# Patient Record
Sex: FEMALE | Employment: FULL TIME | URBAN - METROPOLITAN AREA
[De-identification: names, ages, dates, MRNs, and addresses within clinical notes are randomized per-mention and may not be internally consistent; named-entity substitution may affect disease eponyms.]

---

## 2021-04-09 ENCOUNTER — LAB ENCOUNTER (OUTPATIENT)
Dept: LAB | Age: 61
End: 2021-04-09
Attending: FAMILY MEDICINE
Payer: COMMERCIAL

## 2021-04-09 ENCOUNTER — OFFICE VISIT (OUTPATIENT)
Dept: FAMILY MEDICINE CLINIC | Facility: CLINIC | Age: 61
End: 2021-04-09
Payer: COMMERCIAL

## 2021-04-09 VITALS
SYSTOLIC BLOOD PRESSURE: 149 MMHG | RESPIRATION RATE: 18 BRPM | BODY MASS INDEX: 22.5 KG/M2 | WEIGHT: 131.81 LBS | DIASTOLIC BLOOD PRESSURE: 88 MMHG | HEART RATE: 83 BPM | HEIGHT: 64 IN | TEMPERATURE: 97 F

## 2021-04-09 DIAGNOSIS — Z12.31 ENCOUNTER FOR SCREENING MAMMOGRAM FOR BREAST CANCER: ICD-10-CM

## 2021-04-09 DIAGNOSIS — E78.5 HYPERLIPIDEMIA, UNSPECIFIED HYPERLIPIDEMIA TYPE: ICD-10-CM

## 2021-04-09 DIAGNOSIS — E11.39 TYPE 2 DIABETES MELLITUS WITH OTHER OPHTHALMIC COMPLICATION, WITHOUT LONG-TERM CURRENT USE OF INSULIN (HCC): Primary | ICD-10-CM

## 2021-04-09 DIAGNOSIS — E11.39 TYPE 2 DIABETES MELLITUS WITH OTHER OPHTHALMIC COMPLICATION, WITHOUT LONG-TERM CURRENT USE OF INSULIN (HCC): ICD-10-CM

## 2021-04-09 DIAGNOSIS — E13.69 OTHER SPECIFIED DIABETES MELLITUS WITH OTHER SPECIFIED COMPLICATION, UNSPECIFIED WHETHER LONG TERM INSULIN USE (HCC): ICD-10-CM

## 2021-04-09 DIAGNOSIS — I10 ESSENTIAL HYPERTENSION: ICD-10-CM

## 2021-04-09 DIAGNOSIS — Z12.11 COLON CANCER SCREENING: ICD-10-CM

## 2021-04-09 PROCEDURE — 83036 HEMOGLOBIN GLYCOSYLATED A1C: CPT | Performed by: FAMILY MEDICINE

## 2021-04-09 PROCEDURE — 82947 ASSAY GLUCOSE BLOOD QUANT: CPT | Performed by: FAMILY MEDICINE

## 2021-04-09 PROCEDURE — 36415 COLL VENOUS BLD VENIPUNCTURE: CPT

## 2021-04-09 PROCEDURE — 99203 OFFICE O/P NEW LOW 30 MIN: CPT | Performed by: FAMILY MEDICINE

## 2021-04-09 PROCEDURE — 85027 COMPLETE CBC AUTOMATED: CPT

## 2021-04-09 PROCEDURE — 82570 ASSAY OF URINE CREATININE: CPT

## 2021-04-09 PROCEDURE — 84443 ASSAY THYROID STIM HORMONE: CPT

## 2021-04-09 PROCEDURE — 3008F BODY MASS INDEX DOCD: CPT | Performed by: FAMILY MEDICINE

## 2021-04-09 PROCEDURE — 3079F DIAST BP 80-89 MM HG: CPT | Performed by: FAMILY MEDICINE

## 2021-04-09 PROCEDURE — 82043 UR ALBUMIN QUANTITATIVE: CPT

## 2021-04-09 PROCEDURE — 80061 LIPID PANEL: CPT

## 2021-04-09 PROCEDURE — 36416 COLLJ CAPILLARY BLOOD SPEC: CPT | Performed by: FAMILY MEDICINE

## 2021-04-09 PROCEDURE — 3077F SYST BP >= 140 MM HG: CPT | Performed by: FAMILY MEDICINE

## 2021-04-09 PROCEDURE — 80053 COMPREHEN METABOLIC PANEL: CPT

## 2021-04-09 RX ORDER — B-COMPLEX WITH VITAMIN C
2 TABLET ORAL DAILY
COMMUNITY
Start: 2020-12-07 | End: 2021-12-02

## 2021-04-09 RX ORDER — ROSUVASTATIN CALCIUM 10 MG/1
10 TABLET, COATED ORAL NIGHTLY
Qty: 90 TABLET | Refills: 3 | Status: SHIPPED | OUTPATIENT
Start: 2021-04-09 | End: 2021-04-15

## 2021-04-09 RX ORDER — LISINOPRIL 10 MG/1
10 TABLET ORAL DAILY
Qty: 90 TABLET | Refills: 3 | Status: SHIPPED | OUTPATIENT
Start: 2021-04-09 | End: 2021-04-09

## 2021-04-09 RX ORDER — LISINOPRIL 10 MG/1
10 TABLET ORAL DAILY
Qty: 90 TABLET | Refills: 3 | Status: SHIPPED | OUTPATIENT
Start: 2021-04-09 | End: 2021-04-15

## 2021-04-09 RX ORDER — ROSUVASTATIN CALCIUM 10 MG/1
10 TABLET, COATED ORAL NIGHTLY
Qty: 90 TABLET | Refills: 3 | Status: SHIPPED | OUTPATIENT
Start: 2021-04-09 | End: 2021-04-09

## 2021-04-09 NOTE — PROGRESS NOTES
HPI/Subjective:   Patient ID: Melissa Pérez is a 63year old female. New patient presents with a history of diabetes, diabetic eye complications, hypertension. Diabetes  She has type 2 diabetes mellitus. Her disease course has been worsening.  There a tenderness. Lymphadenopathy:      Cervical: No cervical adenopathy. Skin:     General: Skin is warm and dry. Neurological:      Mental Status: She is alert and oriented to person, place, and time.          Assessment & Plan:   Type 2 diabetes mellitus Platelet, No Differential [E]      Meds This Visit:  Requested Prescriptions     Signed Prescriptions Disp Refills   • metFORMIN HCl 500 MG Oral Tab 120 tablet 0     Sig: Take one tablet in the morning with breakfast for 5 days, then take one tablet twice

## 2021-04-09 NOTE — PATIENT INSTRUCTIONS
Schedule diabetic education    Check blood sugar before breakfast and 2 hours after largest meal.  Goal fasting less than 120, after meals less than 160    Check blood pressures about twice a week ALWAYS sitting for at least 5 minutes!      Start Metformin

## 2021-04-15 ENCOUNTER — TELEPHONE (OUTPATIENT)
Dept: FAMILY MEDICINE CLINIC | Facility: CLINIC | Age: 61
End: 2021-04-15

## 2021-04-15 RX ORDER — LISINOPRIL 10 MG/1
10 TABLET ORAL DAILY
Qty: 90 TABLET | Refills: 3 | Status: SHIPPED | OUTPATIENT
Start: 2021-04-15

## 2021-04-15 RX ORDER — ROSUVASTATIN CALCIUM 10 MG/1
10 TABLET, COATED ORAL NIGHTLY
Qty: 90 TABLET | Refills: 3 | Status: SHIPPED | OUTPATIENT
Start: 2021-04-15

## 2021-04-15 NOTE — TELEPHONE ENCOUNTER
Patient states the pharmacy did not receive medication refills. Please resend. She is requesting a call back when it is sent.    lisinopril 10 MG Oral Tab    Rosuvastatin Calcium 10 MG Oral Tab  metFORMIN HCl 500 MG Oral Tab

## 2021-04-15 NOTE — TELEPHONE ENCOUNTER
Spoke to patient and she says she spoke to Aurora Flores and the meds below are not sent. Advised her that she has 3 [facility] listed in her chart. Per patient she want's only the [facility] at [address]  I called the # to that [facility] and spok

## 2021-04-15 NOTE — TELEPHONE ENCOUNTER
Patient calling stating \"I missed a call. Someone called me about my results I think. \" Advised of the note attached to her results from 4/9/21:    Please let patient know labs all look good except high cholesterol.  She should go ahead with starting Metf

## 2021-04-30 ENCOUNTER — HOSPITAL ENCOUNTER (OUTPATIENT)
Dept: ENDOCRINOLOGY | Facility: HOSPITAL | Age: 61
End: 2021-04-30
Attending: FAMILY MEDICINE

## 2021-05-07 ENCOUNTER — APPOINTMENT (OUTPATIENT)
Dept: ENDOCRINOLOGY | Facility: HOSPITAL | Age: 61
End: 2021-05-07
Attending: FAMILY MEDICINE
Payer: COMMERCIAL

## 2021-07-30 ENCOUNTER — HOSPITAL ENCOUNTER (OUTPATIENT)
Dept: ENDOCRINOLOGY | Facility: HOSPITAL | Age: 61
Discharge: HOME | End: 2021-07-30
Attending: FAMILY MEDICINE
Payer: COMMERCIAL

## 2021-07-30 ENCOUNTER — TELEPHONE (OUTPATIENT)
Dept: FAMILY MEDICINE CLINIC | Facility: CLINIC | Age: 61
End: 2021-07-30

## 2021-07-30 VITALS — WEIGHT: 135 LBS | BODY MASS INDEX: 23 KG/M2

## 2021-07-30 DIAGNOSIS — E11.9 TYPE 2 DIABETES MELLITUS (HCC): Primary | ICD-10-CM

## 2021-07-30 NOTE — PROGRESS NOTES
More Vieira  : 1960 attended individual initial assessment for Diabetes Education:    Date: 2021   Start time: 830  End time:     HEMOGLOBIN A1C (%)   Date Value   2021 11.4 (A)        Assessment: 63year old female presents for DS covered. Patient verbalized understanding and has no further questions at this time.     Isaiah Nielson, RN,MSN

## 2021-07-30 NOTE — TELEPHONE ENCOUNTER
CSS=please call and assists patient to schedule for FU OV to discuss diabetes medication , please patient to have the blood sugar record and PCP will check it-see DR Butler's note below.     Anneliese Garcia MD  Em Rn Triage 55 minutes ago (9:42 AM)        P

## 2021-08-06 ENCOUNTER — APPOINTMENT (OUTPATIENT)
Dept: ENDOCRINOLOGY | Facility: HOSPITAL | Age: 61
End: 2021-08-06
Attending: FAMILY MEDICINE
Payer: COMMERCIAL

## 2021-08-12 NOTE — TELEPHONE ENCOUNTER
No MyChart. No response from the patient. NO PHONE RESPONSE letter mailed today. Thi encounter closed. No future appointments.

## 2022-04-22 ENCOUNTER — HOSPITAL ENCOUNTER (OUTPATIENT)
Dept: MRI IMAGING | Facility: HOSPITAL | Age: 62
Discharge: HOME | End: 2022-04-22
Attending: OPHTHALMOLOGY
Payer: COMMERCIAL

## 2022-04-22 DIAGNOSIS — H49.21 SIXTH NERVE PALSY, RIGHT: ICD-10-CM

## 2022-04-22 LAB — CREAT BLD-MCNC: 0.6 MG/DL

## 2022-04-22 PROCEDURE — A9575 INJ GADOTERATE MEGLUMI 0.1ML: HCPCS | Performed by: OPHTHALMOLOGY

## 2022-04-22 PROCEDURE — 70543 MRI ORBT/FAC/NCK W/O &W/DYE: CPT | Performed by: OPHTHALMOLOGY

## 2022-04-22 PROCEDURE — 82565 ASSAY OF CREATININE: CPT

## 2022-04-22 PROCEDURE — 70553 MRI BRAIN STEM W/O & W/DYE: CPT | Performed by: OPHTHALMOLOGY

## 2022-05-04 ENCOUNTER — TELEPHONE (OUTPATIENT)
Dept: FAMILY MEDICINE CLINIC | Facility: CLINIC | Age: 62
End: 2022-05-04

## 2022-08-16 ENCOUNTER — MED REC SCAN ONLY (OUTPATIENT)
Dept: FAMILY MEDICINE CLINIC | Facility: CLINIC | Age: 62
End: 2022-08-16

## 2023-04-10 ENCOUNTER — TELEPHONE (OUTPATIENT)
Dept: FAMILY MEDICINE CLINIC | Facility: CLINIC | Age: 63
End: 2023-04-10

## 2023-04-13 ENCOUNTER — PATIENT OUTREACH (OUTPATIENT)
Dept: CASE MANAGEMENT | Age: 63
End: 2023-04-13

## 2023-04-13 NOTE — PROCEDURES
The office order for PCP removal request is Approved and finalized on April 13, 2023.     Thanks,  [facility] [employer]

## 2025-02-18 ENCOUNTER — HOSPITAL ENCOUNTER (EMERGENCY)
Facility: HOSPITAL | Age: 65
Discharge: HOME | End: 2025-02-19
Attending: EMERGENCY MEDICINE
Payer: OTHER MISCELLANEOUS

## 2025-02-18 ENCOUNTER — APPOINTMENT (OUTPATIENT)
Dept: MRI IMAGING | Facility: HOSPITAL | Age: 65
End: 2025-02-18
Attending: EMERGENCY MEDICINE
Payer: OTHER MISCELLANEOUS

## 2025-02-18 DIAGNOSIS — M54.16 LUMBAR RADICULOPATHY: ICD-10-CM

## 2025-02-18 DIAGNOSIS — M54.12 CERVICAL RADICULOPATHY: Primary | ICD-10-CM

## 2025-02-18 PROCEDURE — 99284 EMERGENCY DEPT VISIT MOD MDM: CPT

## 2025-02-18 PROCEDURE — 99285 EMERGENCY DEPT VISIT HI MDM: CPT

## 2025-02-18 PROCEDURE — 72148 MRI LUMBAR SPINE W/O DYE: CPT | Performed by: EMERGENCY MEDICINE

## 2025-02-18 PROCEDURE — 72141 MRI NECK SPINE W/O DYE: CPT | Performed by: EMERGENCY MEDICINE

## 2025-02-18 PROCEDURE — 70551 MRI BRAIN STEM W/O DYE: CPT | Performed by: EMERGENCY MEDICINE

## 2025-02-18 RX ORDER — LIDOCAINE 50 MG/G
1 PATCH TOPICAL EVERY 24 HOURS
Qty: 10 PATCH | Refills: 0 | Status: SHIPPED | OUTPATIENT
Start: 2025-02-18

## 2025-02-18 RX ORDER — ACETAMINOPHEN 325 MG/1
650 TABLET ORAL EVERY 6 HOURS PRN
Qty: 20 TABLET | Refills: 0 | Status: SHIPPED | OUTPATIENT
Start: 2025-02-18

## 2025-02-19 VITALS
BODY MASS INDEX: 25.52 KG/M2 | SYSTOLIC BLOOD PRESSURE: 155 MMHG | DIASTOLIC BLOOD PRESSURE: 93 MMHG | TEMPERATURE: 98 F | OXYGEN SATURATION: 98 % | HEIGHT: 63 IN | HEART RATE: 72 BPM | WEIGHT: 144 LBS | RESPIRATION RATE: 18 BRPM

## 2025-02-19 NOTE — ED PROVIDER NOTES
Patient Seen in: [facility] Emergency Department    History     Chief Complaint   Patient presents with    Trauma    Back Pain    Abdomen/Flank Pain    Neck Pain       HPI    64-year-old female with hypertension, diabetes, hyperlipidemia who was sent here from her doctor's office today to get an MRI.  Patient was injured at work 2 to 3 weeks ago was seen at the ER that day at [facility].  CTs of her neck abdomen/pelvis/lumbar spine.  No acute injury was discharged home.  Since then she is states she has been having continued pain in her arms and legs feel weak.  No trouble moving arms or legs.  No trouble walking.  No slurred speech or facial droop.  Her doctor sent her here to get MRIs of her brain, C-spine, L-spine.  She has no trouble urinating or having bowel movements.  No numbness in her groin area.    History reviewed.   Past Medical History:    Diabetes (HCC)    Essential hypertension    Hyperlipidemia       History reviewed. History reviewed. No pertinent surgical history.      Medications :  Prescriptions Prior to Admission[1]     History reviewed. No pertinent family history.    Smoking Status:   Social History     Socioeconomic History    Marital status:    Tobacco Use    Smoking status: Never    Smokeless tobacco: Never   Substance and Sexual Activity    Alcohol use: Never    Drug use: Never       Constitutional and vital signs reviewed.      Social History and Family History elements reviewed from today, pertinent positives to the presenting problem noted.    Physical Exam     ED Triage Vitals   BP 02/18/25 1908 (!) 172/88   Pulse 02/18/25 1908 76   Resp 02/18/25 1908 18   Temp 02/18/25 1908 98 °F (36.7 °C)   Temp src 02/19/25 0122 Oral   SpO2 02/18/25 1908 99 %   O2 Device 02/18/25 1908 None (Room air)       All measures to prevent infection transmission during my interaction with the patient were taken. Handwashing was performed prior to and after the exam.  Stethoscope and any  equipment used during my examination was cleaned with super sani-cloth germicidal wipes following the exam.     Physical Exam  Vitals and nursing note reviewed.   HENT:      Head: Normocephalic.   Cardiovascular:      Rate and Rhythm: Normal rate.   Pulmonary:      Effort: Pulmonary effort is normal.   Abdominal:      General: Bowel sounds are normal.      Palpations: Abdomen is soft.      Tenderness: There is no abdominal tenderness.   Skin:     General: Skin is warm and dry.      Capillary Refill: Capillary refill takes less than 2 seconds.   Neurological:      General: No focal deficit present.      Mental Status: She is alert.         ED Course      Labs Reviewed - No data to display    As Interpreted by me    Imaging Results Available and Reviewed while in ED: MRI SPINE LUMBAR (CPT=72148)    Result Date: 2/19/2025  CONCLUSION:  1. Multilevel degenerative the lumbar spine, most notably at L4-L5, where there is moderate spinal canal narrowing, subarticular zone stenosis, and mild to moderate right greater than left foraminal impingement.  2. Additional degeneration of above described.  3. No acute osseous injury of the lumbar spine is detected.  4. Indeterminate prominence of the endometrial complex. Consider further gynecologic workup.  5. Nonspecific apparent mural thickening of the aorta. Correlation for potential underlying large vessel vasculitis is suggested.   6. Lesser incidental findings as above.    A preliminary report was issued by the [facility] teleradiology service. There are no major discrepancies.   Dictated by (CST): Brendon Vargas MD on 2/19/2025 at 11:27 AM     Finalized by (CST): Brendon Vargas MD on 2/19/2025 at 11:34 AM          MRI SPINE CERVICAL (CPT=72141)    Result Date: 2/19/2025  CONCLUSION:  Motion-degraded study. Within these parameters: 1. Multilevel degenerative changes of the cervical spine with ventral thecal sac effacement and varying degrees of foraminal narrowing, as  detailed above.  2. No abnormal intrinsic cord signal intensity is perceived.  3. No acute osseous injury of the cervical spine is detected.  4. Lesser incidental findings as above.    A preliminary report was issued by the [facility] teleradiology service. There are no major discrepancies.   Dictated by (CST): Brendon Vargas MD on 2/19/2025 at 11:19 AM     Finalized by (CST): Brendon Vargas MD on 2/19/2025 at 11:26 AM          MRI BRAIN (CPT=70551)    Result Date: 2/19/2025  CONCLUSION:  1. No acute intracranial process by noncontrast MR technique.  2. Senescent changes of parenchymal volume loss with sequela of chronic microvascular ischemic disease.  3. Lesser incidental findings as above.     A preliminary report was issued by the [facility] teleradiology service. There are no major discrepancies.   Dictated by (CST): Brendon Vargas MD on 2/19/2025 at 10:30 AM     Finalized by (CST): Brendon Vargas MD on 2/19/2025 at 10:38 AM         ED Medications Administered: Medications - No data to display      MDM     Vitals:    02/18/25 1908 02/18/25 2000 02/18/25 2030 02/19/25 0122   BP: (!) 172/88 (!) 161/93 (!) 155/93 (!) 155/93   Pulse: 76 71 70 72   Resp: 18 16 16 18   Temp: 98 °F (36.7 °C)   98 °F (36.7 °C)   TempSrc:    Oral   SpO2: 99% 97% 97% 98%   Weight: 65.3 kg      Height: 160 cm (5' 3\")        *I personally reviewed and interpreted all ED vitals.    Pulse Ox: 99%, Room air, Normal       Differential Diagnosis/ Diagnostic Considerations: Cauda equina, fracture, DJD, musculoskeletal pain    Complicating Factors: The patient already has does not have a problem list on file. to contribute to the complexity of this ED evaluation.    Medical Decision Making  Amount and/or Complexity of Data Reviewed  External Data Reviewed: radiology and notes.     Details: Reviewed previous ER visits at [facility] for evaluation and CT results of her neck and abdomen/pelvis/lumbar spine.  Also reviewed  patient's prescription that was written from her primary care provider today for 1 MRIs he requested due to her continued symptoms.  Radiology: ordered. Decision-making details documented in ED Course.    Risk  OTC drugs.        Condition upon leaving the department: Stable    Disposition and Plan     Clinical Impression:  1. Cervical radiculopathy    2. Lumbar radiculopathy        Disposition:  Discharge    Follow-up:  Luis A Stover MD  [address] [facility] [address]  [facility] [address]  [phone]    Follow up      Debbie Ramos MD  [address] [address]  [phone]    Follow up        Medications Prescribed:  Discharge Medication List as of 2/19/2025  1:22 AM        START taking these medications    Details   lidocaine 5 % External Patch Place 1 patch onto the skin daily., Normal, Disp-10 patch, R-0      acetaminophen 325 MG Oral Tab Take 2 tablets (650 mg total) by mouth every 6 (six) hours as needed for Pain., Normal, Disp-20 tablet, R-0                              [1] (Not in a hospital admission)

## 2025-02-19 NOTE — ED INITIAL ASSESSMENT (HPI)
Attached by a resident at her job on 1/27/25 describing having a large table pushed into her abdomen and pinned against a wall. Pt c/o increasing pain in her neck, abdomen, and back since injury. Pt seen at [facility] had CT scan of neck, abdomen, and back the day after the injury.

## 2025-02-19 NOTE — ED PROVIDER NOTES
Pt signed out by Dr Laboy to f/u on MRI scans.  No signs of acute injury. Pt was advised of non-emergent findings and to f/u with her PCP.     MRI OF THE BRAIN, CERVICAL SPINE AND LUMBAR SPINE WITHOUT CONTRAST    Compared to 4/22/2022    IMPRESSION:  BRAIN  No acute intracranial abnormality.  No acute bleed, hydrocephalus or shift of midline structures.  No acute infarct.  Moderate chronic microangiopathic ischemic changes.  Major intracranial vascular flow voids are preserved.  Partially empty sella.  No interval change.    CSPINE  No acute abnormality.  Probable chronic C3 compression deformity.  Moderate multilevel degenerative changes.  No severe central canal narrowing.  No focal cord compression.  No focal abnormal cord signal.  No obvious paravertebral or epidural hematoma/collection.    LSPINE  No definite  acute abnormality.  Mild to moderate multilevel degenerative changes.  Most pronounced at L4-5 level.  No severe central canal narrowing.  No obvious paravertebral or epidural hematoma/collection.  Incidental note of mild diffuse aortic mural thickening that could be related to advanced atherosclerotic disease or underlying large vessel vasculitis, consider correlation with pertinent history.  Thickened endometrium measuring up to 1.8 cm with heterogenous appearance, consider correlation with ultrasound and gynecologic evaluation on an elective basis.

## 2025-02-19 NOTE — DISCHARGE INSTRUCTIONS
Follow-up with your primary care provider in 1 to 2 days.  Given physiatry referral, call to schedule appointment.  Continue taking Tylenol ibuprofen as needed for pain.  Return to the ER if some continue, get worse, unable to follow-up

## 2025-02-19 NOTE — ED QUICK NOTES
Pt discharged. Pt verbalized understanding of discharge instructions and will  prescriptions at pharmacy listed on discharge papers. Pt A/O x4, speaking complete sentences, and ambulated out of ED with a steady gait. All questions answered at this time.

## (undated) NOTE — LETTER
8/12/2021              [address] [place] [id number]         Dear Abdoulaye Bergeron,    This letter is to inform you that our office has made several attempts to reach you by phone without success.   We were attempting to